# Patient Record
Sex: MALE | Race: WHITE | NOT HISPANIC OR LATINO
[De-identification: names, ages, dates, MRNs, and addresses within clinical notes are randomized per-mention and may not be internally consistent; named-entity substitution may affect disease eponyms.]

---

## 2019-09-06 ENCOUNTER — TRANSCRIPTION ENCOUNTER (OUTPATIENT)
Age: 24
End: 2019-09-06

## 2019-10-07 ENCOUNTER — APPOINTMENT (OUTPATIENT)
Dept: INTERNAL MEDICINE | Facility: CLINIC | Age: 24
End: 2019-10-07
Payer: COMMERCIAL

## 2019-10-07 VITALS
RESPIRATION RATE: 16 BRPM | OXYGEN SATURATION: 99 % | BODY MASS INDEX: 36.49 KG/M2 | TEMPERATURE: 99.6 F | HEART RATE: 110 BPM | SYSTOLIC BLOOD PRESSURE: 138 MMHG | WEIGHT: 219 LBS | DIASTOLIC BLOOD PRESSURE: 89 MMHG | HEIGHT: 65 IN

## 2019-10-07 DIAGNOSIS — R50.9 FEVER, UNSPECIFIED: ICD-10-CM

## 2019-10-07 PROCEDURE — 36415 COLL VENOUS BLD VENIPUNCTURE: CPT

## 2019-10-07 PROCEDURE — 99203 OFFICE O/P NEW LOW 30 MIN: CPT | Mod: 25

## 2019-10-07 NOTE — ASSESSMENT
[FreeTextEntry1] : check labs as ordered\par pt to watch temps at home\par pt to call office tomm to review labs and status

## 2019-10-07 NOTE — HISTORY OF PRESENT ILLNESS
[FreeTextEntry8] : Fever for 24hrs--headache--fever 103.6 this am--no recent travel--joint pain or procedures--no focal uri/uti/gi symps--no phtophobia

## 2019-10-07 NOTE — PHYSICAL EXAM
[Normal Oropharynx] : the oropharynx was normal [No Lymphadenopathy] : no lymphadenopathy [Normal] : soft, non-tender, non-distended, no masses palpated, no HSM and normal bowel sounds

## 2019-10-08 LAB
ALBUMIN SERPL ELPH-MCNC: 4.9 G/DL
ALP BLD-CCNC: 88 U/L
ALT SERPL-CCNC: 112 U/L
ANION GAP SERPL CALC-SCNC: 15 MMOL/L
AST SERPL-CCNC: 98 U/L
BASOPHILS # BLD AUTO: 0.03 K/UL
BASOPHILS NFR BLD AUTO: 0.6 %
BILIRUB SERPL-MCNC: 1 MG/DL
BUN SERPL-MCNC: 13 MG/DL
CALCIUM SERPL-MCNC: 9.7 MG/DL
CHLORIDE SERPL-SCNC: 95 MMOL/L
CO2 SERPL-SCNC: 25 MMOL/L
CREAT SERPL-MCNC: 1.19 MG/DL
EOSINOPHIL # BLD AUTO: 0.03 K/UL
EOSINOPHIL NFR BLD AUTO: 0.6 %
GLUCOSE SERPL-MCNC: 108 MG/DL
HCT VFR BLD CALC: 51.5 %
HGB BLD-MCNC: 18 G/DL
IMM GRANULOCYTES NFR BLD AUTO: 0.6 %
LYMPHOCYTES # BLD AUTO: 0.55 K/UL
LYMPHOCYTES NFR BLD AUTO: 10.3 %
MAN DIFF?: NORMAL
MCHC RBC-ENTMCNC: 27.4 PG
MCHC RBC-ENTMCNC: 35 GM/DL
MCV RBC AUTO: 78.5 FL
MONOCYTES # BLD AUTO: 0.39 K/UL
MONOCYTES NFR BLD AUTO: 7.3 %
NEUTROPHILS # BLD AUTO: 4.31 K/UL
NEUTROPHILS NFR BLD AUTO: 80.6 %
PLATELET # BLD AUTO: 154 K/UL
POTASSIUM SERPL-SCNC: 4.8 MMOL/L
PROT SERPL-MCNC: 7.8 G/DL
RBC # BLD: 6.56 M/UL
RBC # FLD: 15.2 %
SODIUM SERPL-SCNC: 135 MMOL/L
WBC # FLD AUTO: 5.34 K/UL

## 2019-10-09 LAB — HETEROPH AB SER QL: NEGATIVE

## 2020-03-08 ENCOUNTER — TRANSCRIPTION ENCOUNTER (OUTPATIENT)
Age: 25
End: 2020-03-08

## 2022-04-08 ENCOUNTER — APPOINTMENT (OUTPATIENT)
Dept: ORTHOPEDIC SURGERY | Facility: CLINIC | Age: 27
End: 2022-04-08
Payer: COMMERCIAL

## 2022-04-08 VITALS — BODY MASS INDEX: 36.49 KG/M2 | WEIGHT: 219 LBS | HEIGHT: 65 IN

## 2022-04-08 DIAGNOSIS — Z78.9 OTHER SPECIFIED HEALTH STATUS: ICD-10-CM

## 2022-04-08 DIAGNOSIS — M25.571 PAIN IN RIGHT ANKLE AND JOINTS OF RIGHT FOOT: ICD-10-CM

## 2022-04-08 PROCEDURE — 73630 X-RAY EXAM OF FOOT: CPT | Mod: RT

## 2022-04-08 PROCEDURE — 73610 X-RAY EXAM OF ANKLE: CPT | Mod: RT

## 2022-04-08 PROCEDURE — L4361: CPT | Mod: RT

## 2022-04-08 PROCEDURE — 99204 OFFICE O/P NEW MOD 45 MIN: CPT | Mod: 25

## 2022-04-08 RX ORDER — NAPROXEN 500 MG/1
500 TABLET ORAL
Qty: 40 | Refills: 0 | Status: ACTIVE | COMMUNITY
Start: 2022-04-08 | End: 1900-01-01

## 2022-04-08 RX ORDER — COLCHICINE 0.6 MG/1
0.6 TABLET ORAL TWICE DAILY
Qty: 15 | Refills: 0 | Status: ACTIVE | COMMUNITY
Start: 2022-04-08 | End: 1900-01-01

## 2022-04-08 NOTE — PHYSICAL EXAM
[de-identified] : General: Well-nourished, well developed female in no apparent distress\par Psych: Clear speech, pleasant mood and affect\par Neurological: Alert and oriented to person, place, and time\par Gait: Antalgic\par Respiratory: Normal respiratory effort\par Skin: No rashes, no lesions, no open skin, no ecchymosis, no erythema\par \par \par Inspection: No ecchymosis or redness noted. There is swelling across his midfoot\par \par Alignment: Hindfoot alignment in varus with foot supination noted.\par  \par Palpation: No anterior medial, central or lateral ankle joint line tenderness. No posterior medial or lateral ankle pain. No pain over proximal, midshaft or distal tibia or fibula. Leg Compartments are soft and nontender. Calf is soft and non-tender with a down-going Pantoja test. No pain over the lateral and medial malleolus. No pain over ATFL and CFL. Non-tender over the deltoid ligament or proximal and distal syndesmosis. Negative squeeze test of the syndesmosis. Non-tender over the fibula head or neck. Non-tender over the sinus tarsi.\par No pain over the course of the achilles, peroneal, posterior tibial, anterior tibial or the extensor tendons. \par On examination of the foot: Foot compartments are soft. No pain over the heel or plantar fascia. No tenderness over the transverse tarsal joints, TMT or the Lisfranc joints on palpation and stress examination. He is tender over the navicular, cuboid, cuneiforms, and more tender over the metatarsals shafts. No pain beneath the metatarsal heads. Full range of motion through the MTP joints. The toes are reduced and well aligned. No pain on examination of the toes, and no webspace tenderness noted\par \par ROM: 15-20 degrees of dorsiflexion, 20 degrees of plantar flexion, 10 degrees of inversion and 10 degrees of eversion without pain. Able to flex and extend all toes without pain \par \par Muscle Strength: 4/5 strength with resisted dorsiflexion, plantar flexion, inversion and eversion with foot pain\par \par Stability: No instability or laxity noted with varus/valgus stress. Negative anterior and posterior drawer test. No pain with dorsiflexion external rotation stress test.\par \par Neurologic Exam : Sensation is grossly intact bilateral upper and lower extremities to light touch throughout. Sensation intact to the sural, posterior tibial, superficial peroneal nerve. 2+ patellar tendon and Achilles tendon reflexes are noted. There is a downgoing Babinski test. No clonus is noted bilaterally.\par \par Vascular: Arterial Pulses right and Left: posterior tibialis normal and dorsalis pedis normal. Right and Left: no edema, no varicosities and brisk capillary refill test normal.\par \par Radiographs: X-rays done today in the office 3 views of the ankle and foot without any limitations which reveal: No acute fractures or dislocations seen. The ankle mortise and syndesmosis are reduced and well aligned. There is no widening of the syndesmosis. No evidence of an osteochondral defect. No fractures of the lateral process of the talus or of the anterior process of the calcaneus noted. The midfoot and forefoot joints are reduced and well aligned.\par

## 2022-04-08 NOTE — ASSESSMENT
[FreeTextEntry1] : After their examination today in the office and review of their radiographs I do think the swelling and redness and warmth over the right foot is most likely secondary to localized acute gouty arthropathy and synovitis or possible inflammation from over stress to the metatarsals as a result of the varus and supination of his hindfoot and midfoot. I would recommend conservative treatment with oral medication including colchicine starting with 1 dose now and then proceeding with twice a day dosing till the pain and swelling and redness have resolved as well as an anti-inflammatory on a daily basis if tolerated. I also recommended protection and immobilization of the midfoot and forefoot as they are quite uncomfortable placing the foot in any shoe and are having difficulty wearing a closed shoe as result of the pain and swelling and sensitivity. I did recommend protection and immobilization for the next few days in a short cam walker boot. Time taken and the cam walker boot was manipulated and fitted to them and demonstrated and taught to them how to apply and remove the boot. They can remove the boot for range of motion exercises throughout the day as well as for sleeping and bathing. I did recommend elevation of the lower extremity. I would like to see them back in 3 to 5 days for repeat clinical examination and we discussed that if the pain or redness increases or if they develop any fever chills, they should present immediately to my office or present to the emergency room\par

## 2022-04-08 NOTE — REASON FOR VISIT
[FreeTextEntry2] : patient states that he has had some foot pain that increased.  Woke up this morning with a swollen foot without any known injury

## 2022-04-08 NOTE — HISTORY OF PRESENT ILLNESS
[Gradual] : gradual [9] : 9 [3] : 3 [Throbbing] : throbbing [Occasional] : occasional [Rest] : rest [Standing] : standing [Walking] : walking [Full time] : Work status: full time [de-identified] : Mr. SAPP is a 26 year male who presents today for evaluation of their right foot pain and swelling. He states that over the past two to three days he has been noticing increasing pain and swelling in his right foot. He does not recall any injury to the foot. He does not have any history of gout. He does have a history of multiple ankle and foot sprains in the past. He states that he does notice that he walks on the outside aspect of his right foot more than the left. He denies any fever or chills and he does not notice any redness or warmth or bruising of the ankle foot or toes. 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

## 2022-04-15 ENCOUNTER — APPOINTMENT (OUTPATIENT)
Dept: ORTHOPEDIC SURGERY | Facility: CLINIC | Age: 27
End: 2022-04-15
Payer: COMMERCIAL

## 2022-04-15 PROCEDURE — 99213 OFFICE O/P EST LOW 20 MIN: CPT

## 2022-04-15 NOTE — ASSESSMENT
[FreeTextEntry1] : After his examination today in the office and review of his previous radiographs I do think he has had an excellent response with an anti inflammatory and anti gout medication. I do think he had an inflammatory response possibly secondary to acute gout which has resolved. I would like him to stop the anti inflammatories and transition out of the boot into a good supportive shoe. As a result of his cable verious alignment I would like to offload the lateral column with corrective custom orthotics with a lateral posted heel wedge and a medial arch support in order to normalize the pressure and stress to the ankle and foot he was given a prescription for the orthotics and I would like to see him back with his orthotics in six to eight weeks

## 2022-04-15 NOTE — HISTORY OF PRESENT ILLNESS
[de-identified] : Wilbur is here for follow up of his foot pain and swelling. He has started an anti inflammatory as well as culture scene for treatment of acute inflammation and most likely gouty attack. His pain has now completely resolved he does not notice any swelling or redness or pain of the ankle and foot. He does feel that he is ready to transition out of the boot.

## 2022-06-03 ENCOUNTER — APPOINTMENT (OUTPATIENT)
Dept: ORTHOPEDIC SURGERY | Facility: CLINIC | Age: 27
End: 2022-06-03

## 2022-06-08 ENCOUNTER — APPOINTMENT (OUTPATIENT)
Dept: ORTHOPEDIC SURGERY | Facility: CLINIC | Age: 27
End: 2022-06-08
Payer: COMMERCIAL

## 2022-06-08 VITALS — HEIGHT: 65 IN | WEIGHT: 219 LBS | BODY MASS INDEX: 36.49 KG/M2

## 2022-06-08 DIAGNOSIS — M10.071 IDIOPATHIC GOUT, RIGHT ANKLE AND FOOT: ICD-10-CM

## 2022-06-08 DIAGNOSIS — Q66.10 CONGEN TALIPES CALCANEOVARUS, UNSP FOOT: ICD-10-CM

## 2022-06-08 DIAGNOSIS — M79.671 PAIN IN RIGHT FOOT: ICD-10-CM

## 2022-06-08 PROCEDURE — 99213 OFFICE O/P EST LOW 20 MIN: CPT

## 2022-06-08 NOTE — PHYSICAL EXAM
[de-identified] : General: Well-nourished, well developed female in no apparent distress\par Psych: Clear speech, pleasant mood and affect\par Neurological: Alert and oriented to person, place, and time\par Gait: Normal\par Respiratory: Normal respiratory effort\par Skin: No rashes, no lesions, no open skin, no ecchymosis, no erythema\par \par On examination of the ankle and foot there is no swelling or ecchymosis or erythema noted. Hindfoot alignment is in slight varus with elevated arch height. There is no pain over the proximal mid shaft or distal tibia or fibula. Negative syndesmotic squeeze test. There is no pain over the ankle joint, subtalar joint, transverse tarsal joints, or TMT joints. No pain over the medial or lateral malleolus. No pain over the proximal or mid shaft tibia or fibula. The leg compartments including the calf are soft and non-tender with a down-going Pantoja test. There is no pain over the ATFL or CFL laterally or deltoid ligament medially. \par There is no pain  over the course of the Achilles, peroneal or posterior tibial tendons. No ankle or hindfoot instability is noted. \par No pain over the calcaneus, talar neck or talar head. No pain over the navicular, cuboid, cuneiforms, metatarsal shafts or on examination of the toes. No web-space tenderness. No pain over the metatarsal heads or MTP joints. There is full flexion and extension of all the toes. The hallux and lesser toes are reduced and well aligned. \par Sensation is grossly intact throughout to light touch, there is 2+ Achilles tendon reflexes. There is 2+ DP/PT pulses, with brisk capillary refill in all of the toes.\par There is good range of motion of the ankle and hindfoot with 5/5 strength throughout all muscle groups.\par \par \par

## 2022-06-08 NOTE — ASSESSMENT
[FreeTextEntry1] : After his examination today in the office and review of his radiographs done previously he is doing very well in all of his pain and swelling have completely resolved and I do think this was most likely secondary to either localized inflammation as a result of overstress to the lateral column of his foot or acute gouty arthropathy. I discussed with him since he does notice that his custom orthotics have made his feet feel much more comfortable in his sneakers and shoes I would continue to recommend him to wear his orthotics on a daily basis in order to correct his cavovarus foot alignment and to evenly distribute the pressure and stress throughout the plantar aspect of his feet. In addition we also discussed the importance of watching what he eats and trying to stay away from foods high in uric acid which we reviewed and he can review and find online foods that have a high concentration uric acid. I discussed with him that if he does experience any pain or any discomfort or notice any swelling or redness of the leg ankle and foot then i would like to see him back as soon as possible period he is very thankful and appreciative and feels that he has made a recovery

## 2022-06-08 NOTE — HISTORY OF PRESENT ILLNESS
[0] : 0 [de-identified] : Mr. SAPP is a 27 year male who presents today for evaluation of their Right foot pain and swelling and cavovarus alignment of his foot. He states that he has no pain at this time he has been using his custom orthotics and feels that his orthotics have helped him significantly. He has no pain and no swelling and no redness or bruising of the ankle foot or toes and he has no pain with his daily activities. He is very thankful and appreciative for his care. [FreeTextEntry1] : rt foot [FreeTextEntry5] :  JORDIN SAPP is a 27 year male who is here today for er foot pain. He is doing better since last visit

## 2022-07-22 ENCOUNTER — APPOINTMENT (OUTPATIENT)
Dept: ORTHOPEDIC SURGERY | Facility: CLINIC | Age: 27
End: 2022-07-22

## 2022-07-22 VITALS — WEIGHT: 219 LBS | BODY MASS INDEX: 36.49 KG/M2 | HEIGHT: 65 IN

## 2022-07-22 DIAGNOSIS — Z00.00 ENCOUNTER FOR GENERAL ADULT MEDICAL EXAMINATION W/OUT ABNORMAL FINDINGS: ICD-10-CM

## 2022-07-22 DIAGNOSIS — S93.492A SPRAIN OF OTHER LIGAMENT OF LEFT ANKLE, INITIAL ENCOUNTER: ICD-10-CM

## 2022-07-22 PROCEDURE — L4361: CPT

## 2022-07-22 PROCEDURE — 99204 OFFICE O/P NEW MOD 45 MIN: CPT

## 2022-07-22 PROCEDURE — 73610 X-RAY EXAM OF ANKLE: CPT | Mod: LT

## 2022-07-22 PROCEDURE — 73620 X-RAY EXAM OF FOOT: CPT | Mod: LT

## 2022-07-22 RX ORDER — NAPROXEN 500 MG/1
500 TABLET ORAL 3 TIMES DAILY
Qty: 60 | Refills: 0 | Status: ACTIVE | COMMUNITY
Start: 2022-07-22 | End: 1900-01-01

## 2022-07-22 NOTE — ASSESSMENT
[FreeTextEntry1] : A/P L ankle sprain with metatarsalgia\par - NSAIDS\par - cam walker tall\par - WBAT\par - PT for ankle strengthening\par - f/u foot/ankle

## 2022-07-22 NOTE — HISTORY OF PRESENT ILLNESS
[de-identified] : 26 y/o M with L ankle pain s/p rolling ankle, now with foot pain also. taking NSAIDS with relief. pt is wearing girlfriends cam walker, now with irritation of calf due to uncovered metal bar in boot. denies numbness/tingling. \par h/o R foot pain in past and was treated for gout. [FreeTextEntry5] : pt c.o pain in lt ankle, states he rolled it friday, \par

## 2022-07-22 NOTE — IMAGING
[de-identified] : PE R foot: +swelling of ankle and foot, +tenderness to ATFL, FROM, +tenderness to 5th MTP, no pain to big toe, NVI [Left] : left foot [There are no fractures, subluxations or dislocations. No significant abnormalities are seen] : There are no fractures, subluxations or dislocations. No significant abnormalities are seen

## 2022-07-29 ENCOUNTER — APPOINTMENT (OUTPATIENT)
Dept: ORTHOPEDIC SURGERY | Facility: CLINIC | Age: 27
End: 2022-07-29